# Patient Record
Sex: MALE | Race: WHITE | NOT HISPANIC OR LATINO | Employment: FULL TIME | URBAN - METROPOLITAN AREA
[De-identification: names, ages, dates, MRNs, and addresses within clinical notes are randomized per-mention and may not be internally consistent; named-entity substitution may affect disease eponyms.]

---

## 2018-02-09 ENCOUNTER — OFFICE VISIT (OUTPATIENT)
Dept: URGENT CARE | Facility: CLINIC | Age: 48
End: 2018-02-09
Payer: COMMERCIAL

## 2018-02-09 VITALS
DIASTOLIC BLOOD PRESSURE: 87 MMHG | TEMPERATURE: 98 F | BODY MASS INDEX: 26.22 KG/M2 | HEART RATE: 84 BPM | SYSTOLIC BLOOD PRESSURE: 129 MMHG | HEIGHT: 68 IN | OXYGEN SATURATION: 100 % | WEIGHT: 173 LBS

## 2018-02-09 DIAGNOSIS — S49.91XA INJURY OF RIGHT SHOULDER, INITIAL ENCOUNTER: Primary | ICD-10-CM

## 2018-02-09 PROCEDURE — 99499 UNLISTED E&M SERVICE: CPT | Mod: S$GLB,,, | Performed by: INTERNAL MEDICINE

## 2018-02-09 NOTE — PROGRESS NOTES
"Subjective:       Patient ID: Konstantin Montes is a 47 y.o. male.    Vitals:  height is 5' 8" (1.727 m) and weight is 78.5 kg (173 lb). His oral temperature is 97.5 °F (36.4 °C). His blood pressure is 129/87 and his pulse is 84. His oxygen saturation is 100%.     Chief Complaint: Shoulder Pain    Fell this morning on Rt. Shoulder.      Shoulder Pain    The pain is present in the right shoulder. This is a new problem. The current episode started today. There has been no history of extremity trauma. The problem occurs constantly. The problem has been unchanged. The quality of the pain is described as burning. The pain is at a severity of 6/10. Associated symptoms include a limited range of motion. Pertinent negatives include no numbness. He has tried nothing for the symptoms.     Review of Systems   Constitution: Negative for weakness and malaise/fatigue.   HENT: Negative for nosebleeds.    Cardiovascular: Negative for chest pain and syncope.   Respiratory: Negative for shortness of breath.    Musculoskeletal: Positive for joint pain (Rt shoulder). Negative for back pain and neck pain.   Gastrointestinal: Negative for abdominal pain.   Genitourinary: Negative for hematuria.   Neurological: Negative for dizziness and numbness.       Objective:      Physical Exam    Assessment:       No diagnosis found.    Plan:         There are no diagnoses linked to this encounter.  Patient has obvious gross deformity to right shoulder and was referred to the ER  "

## 2019-09-12 ENCOUNTER — HOSPITAL ENCOUNTER (OUTPATIENT)
Dept: RADIOLOGY | Facility: HOSPITAL | Age: 49
Discharge: HOME | End: 2019-09-12
Attending: INTERNAL MEDICINE
Payer: COMMERCIAL

## 2019-09-12 ENCOUNTER — OFFICE VISIT (OUTPATIENT)
Dept: NEPHROLOGY | Facility: CLINIC | Age: 49
End: 2019-09-12
Payer: COMMERCIAL

## 2019-09-12 VITALS
OXYGEN SATURATION: 97 % | DIASTOLIC BLOOD PRESSURE: 84 MMHG | SYSTOLIC BLOOD PRESSURE: 102 MMHG | HEIGHT: 68 IN | BODY MASS INDEX: 26.69 KG/M2 | WEIGHT: 176.13 LBS | HEART RATE: 108 BPM

## 2019-09-12 DIAGNOSIS — R94.4 RENAL FUNCTION TEST ABNORMAL: ICD-10-CM

## 2019-09-12 DIAGNOSIS — R94.4 RENAL FUNCTION TEST ABNORMAL: Primary | ICD-10-CM

## 2019-09-12 PROCEDURE — 76770 US EXAM ABDO BACK WALL COMP: CPT | Mod: 26,,, | Performed by: RADIOLOGY

## 2019-09-12 PROCEDURE — 99244 OFF/OP CNSLTJ NEW/EST MOD 40: CPT | Mod: S$GLB,,, | Performed by: INTERNAL MEDICINE

## 2019-09-12 PROCEDURE — 76770 US RETROPERITONEAL COMPLETE: ICD-10-PCS | Mod: 26,,, | Performed by: RADIOLOGY

## 2019-09-12 PROCEDURE — 99999 PR PBB SHADOW E&M-EST. PATIENT-LVL III: CPT | Mod: PBBFAC,,, | Performed by: INTERNAL MEDICINE

## 2019-09-12 PROCEDURE — 99244 PR OFFICE CONSULTATION,LEVEL IV: ICD-10-PCS | Mod: S$GLB,,, | Performed by: INTERNAL MEDICINE

## 2019-09-12 PROCEDURE — 76770 US EXAM ABDO BACK WALL COMP: CPT | Mod: TC,PO

## 2019-09-12 PROCEDURE — 99999 PR PBB SHADOW E&M-EST. PATIENT-LVL III: ICD-10-PCS | Mod: PBBFAC,,, | Performed by: INTERNAL MEDICINE

## 2019-09-12 NOTE — PROGRESS NOTES
Subjective:       Patient ID: Konstantin Montes is a 49 y.o. White male who presents for new patient evaluation for proteinuria.    Konstantin Montes is referred by Charo Wilkes MD to be evaluated for proteinuria.  He reports that he has been having some low back pain and mild flank pain for the past 2-3 weeks intermittently.  He has labs and urine done for insurance purposed and was found to have a Scr of 1.3 and 1.6 grams of proteinuria.  He does take NSAIDs regularly.        Review of Systems   Constitutional: Negative for appetite change, chills and fever.   Eyes: Negative for visual disturbance.   Respiratory: Negative for cough and shortness of breath.    Cardiovascular: Negative for chest pain and leg swelling.   Gastrointestinal: Negative for diarrhea, nausea and vomiting.   Genitourinary: Positive for flank pain. Negative for difficulty urinating, dysuria and hematuria.   Musculoskeletal: Positive for back pain. Negative for myalgias.   Skin: Negative for rash.   Neurological: Negative for headaches.   Psychiatric/Behavioral: Negative for sleep disturbance.       The past medical, family and social histories were reviewed for this encounter.     Past Medical History:   Diagnosis Date    Allergy     Gout     Hyperlipidemia     Hypertension     Hypothyroidism      No past surgical history on file.  Social History     Socioeconomic History    Marital status:      Spouse name: Not on file    Number of children: Not on file    Years of education: Not on file    Highest education level: Not on file   Occupational History    Not on file   Social Needs    Financial resource strain: Not on file    Food insecurity:     Worry: Not on file     Inability: Not on file    Transportation needs:     Medical: Not on file     Non-medical: Not on file   Tobacco Use    Smoking status: Never Smoker    Smokeless tobacco: Never Used   Substance and Sexual Activity    Alcohol use: Yes     Alcohol/week:  "0.0 oz     Comment: rarely    Drug use: No    Sexual activity: Yes     Partners: Female   Lifestyle    Physical activity:     Days per week: Not on file     Minutes per session: Not on file    Stress: Not on file   Relationships    Social connections:     Talks on phone: Not on file     Gets together: Not on file     Attends [employer] service: Not on file     Active member of club or organization: Not on file     Attends meetings of clubs or organizations: Not on file     Relationship status: Not on file   Other Topics Concern    Not on file   Social History Narrative    Not on file     Current Outpatient Medications   Medication Sig    hydrocodone-acetaminophen 5-325mg (NORCO) 5-325 mg per tablet Take 1 tablet by mouth every 4 (four) hours as needed for Pain.    allopurinol (ZYLOPRIM) 300 MG tablet TAKE 1 TABLET DAILY    levothyroxine (SYNTHROID) 50 MCG tablet TAKE 1 TABLET DAILY    naproxen (EC-NAPROSYN) 375 MG TbEC EC tablet Take 1 tablet (375 mg total) by mouth 2 (two) times daily with meals.    scopolamine (TRANSDERM-SCOP) 1.3-1.5 mg (1 mg over 3 days) Place 1 patch onto the skin every 72 hours.     No current facility-administered medications for this visit.        /84 (BP Location: Left arm, Patient Position: Sitting)   Pulse 108   Ht 5' 8" (1.727 m)   Wt 79.9 kg (176 lb 2.4 oz)   SpO2 97%   BMI 26.78 kg/m²     Objective:      Physical Exam   Constitutional: He is oriented to person, place, and time. He appears well-developed and well-nourished. No distress.   HENT:   Head: Normocephalic and atraumatic.   Eyes: Conjunctivae are normal.   Neck: Neck supple. No JVD present.   Cardiovascular: Normal rate, regular rhythm and normal heart sounds. Exam reveals no gallop and no friction rub.   No murmur heard.  Pulmonary/Chest: Effort normal and breath sounds normal. No respiratory distress. He has no wheezes. He has no rales.   Abdominal: Soft. Bowel sounds are normal. He exhibits no " distension. There is no tenderness.   Musculoskeletal: He exhibits no edema.   Neurological: He is alert and oriented to person, place, and time.   Skin: Skin is warm and dry. No rash noted.   Psychiatric: He has a normal mood and affect.   Vitals reviewed.      Assessment:       1. Renal function test abnormal        Plan:   Return to clinic in 1 month.  Labs for next visit include today a rp, ua, upc, cbc, uric acid, renal US.  Baseline creatinine is 1.3 in 2015 and this year.  Plan to assess today to see if she has evidence of a renal injury or disease.  He does not have HTN or any chronic diseases that would place him at risk of renal disease.  Please avoid or minimize all NSAIDS (ibuprofen, motrin, aleve, indocin, naprosyn) to minimize the risk to your kidneys.  Aspirin in a dose of 325 mg or less a day is likely the safest with regards to kidney function.  If you are able to take aspirin and do not have any bleeding problems or ulcers, this may be your best therapy.  Alternatively, acetaminophen (Tylenol) is the safer than NSAIDs with regards to kidney function.  I would ask you take this as directed on the bottle.  It is best to stay under 2 grams a day (4-500 mg tablets a day maximum).

## 2019-09-13 ENCOUNTER — TELEPHONE (OUTPATIENT)
Dept: NEPHROLOGY | Facility: CLINIC | Age: 49
End: 2019-09-13

## 2019-09-13 ENCOUNTER — PATIENT MESSAGE (OUTPATIENT)
Dept: NEPHROLOGY | Facility: CLINIC | Age: 49
End: 2019-09-13

## 2019-09-13 DIAGNOSIS — R94.4 RENAL FUNCTION TEST ABNORMAL: Primary | ICD-10-CM

## 2019-09-13 NOTE — TELEPHONE ENCOUNTER
----- Message from Lisandra Ferrari sent at 9/13/2019  3:22 PM CDT -----  Type:  Test Results    Who Called:  self  Name of Test (Lab/Mammo/Etc):  Labs and ultra sound   Date of Test:  09/12   Ordering Provider:  Dr Hall   Where the test was performed:  [facility]  Best Call Back Number:  [phone]   Additional Information:  Would like to discuss today

## 2019-09-16 ENCOUNTER — PATIENT MESSAGE (OUTPATIENT)
Dept: NEPHROLOGY | Facility: CLINIC | Age: 49
End: 2019-09-16

## 2019-09-16 NOTE — TELEPHONE ENCOUNTER
I spoke to him by phone.  I will expand his lab evaluation and we discussed the possibility of a renal biopsy.    I ordered more labs he will get Thursday or Friday.

## 2019-09-17 ENCOUNTER — LAB VISIT (OUTPATIENT)
Dept: LAB | Facility: HOSPITAL | Age: 49
End: 2019-09-17
Attending: INTERNAL MEDICINE
Payer: COMMERCIAL

## 2019-09-17 DIAGNOSIS — R94.4 RENAL FUNCTION TEST ABNORMAL: ICD-10-CM

## 2019-09-17 PROCEDURE — 86334 IMMUNOFIX E-PHORESIS SERUM: CPT | Mod: 26,,, | Performed by: PATHOLOGY

## 2019-09-17 PROCEDURE — 86334 PATHOLOGIST INTERPRETATION IFE: ICD-10-PCS | Mod: 26,,, | Performed by: PATHOLOGY

## 2019-09-17 PROCEDURE — 86334 IMMUNOFIX E-PHORESIS SERUM: CPT

## 2019-09-17 PROCEDURE — 80074 ACUTE HEPATITIS PANEL: CPT

## 2019-09-17 PROCEDURE — 86592 SYPHILIS TEST NON-TREP QUAL: CPT

## 2019-09-17 PROCEDURE — 36415 COLL VENOUS BLD VENIPUNCTURE: CPT | Mod: PO

## 2019-09-17 PROCEDURE — 80069 RENAL FUNCTION PANEL: CPT

## 2019-09-18 LAB
ALBUMIN SERPL BCP-MCNC: 4.3 G/DL (ref 3.5–5.2)
ANION GAP SERPL CALC-SCNC: 10 MMOL/L (ref 8–16)
BUN SERPL-MCNC: 23 MG/DL (ref 6–20)
CALCIUM SERPL-MCNC: 9.9 MG/DL (ref 8.7–10.5)
CHLORIDE SERPL-SCNC: 100 MMOL/L (ref 95–110)
CO2 SERPL-SCNC: 28 MMOL/L (ref 23–29)
CREAT SERPL-MCNC: 1.2 MG/DL (ref 0.5–1.4)
EST. GFR  (AFRICAN AMERICAN): >60 ML/MIN/1.73 M^2
EST. GFR  (NON AFRICAN AMERICAN): >60 ML/MIN/1.73 M^2
GLUCOSE SERPL-MCNC: 96 MG/DL (ref 70–110)
HAV IGM SERPL QL IA: NEGATIVE
HBV CORE IGM SERPL QL IA: NEGATIVE
HBV SURFACE AG SERPL QL IA: NEGATIVE
HCV AB SERPL QL IA: NEGATIVE
INTERPRETATION SERPL IFE-IMP: NORMAL
PHOSPHATE SERPL-MCNC: 2.9 MG/DL (ref 2.7–4.5)
POTASSIUM SERPL-SCNC: 4.3 MMOL/L (ref 3.5–5.1)
RPR SER QL: NORMAL
SODIUM SERPL-SCNC: 138 MMOL/L (ref 136–145)

## 2019-09-19 LAB — PATHOLOGIST INTERPRETATION IFE: NORMAL

## 2019-09-25 NOTE — TELEPHONE ENCOUNTER
----- Message from Wilfrido Loera sent at 9/25/2019  3:59 PM CDT -----  Contact: self   Patient want to speak with a nurse regarding test results have some questions please call back at [phone] (home) [phone] (work)

## 2019-10-30 ENCOUNTER — OFFICE VISIT (OUTPATIENT)
Dept: NEPHROLOGY | Facility: CLINIC | Age: 49
End: 2019-10-30
Payer: COMMERCIAL

## 2019-10-30 ENCOUNTER — LAB VISIT (OUTPATIENT)
Dept: LAB | Facility: HOSPITAL | Age: 49
End: 2019-10-30
Attending: INTERNAL MEDICINE
Payer: COMMERCIAL

## 2019-10-30 VITALS
SYSTOLIC BLOOD PRESSURE: 124 MMHG | WEIGHT: 178.81 LBS | OXYGEN SATURATION: 98 % | BODY MASS INDEX: 27.1 KG/M2 | HEART RATE: 112 BPM | HEIGHT: 68 IN | DIASTOLIC BLOOD PRESSURE: 80 MMHG

## 2019-10-30 DIAGNOSIS — R94.4 RENAL FUNCTION TEST ABNORMAL: ICD-10-CM

## 2019-10-30 DIAGNOSIS — R94.4 RENAL FUNCTION TEST ABNORMAL: Primary | ICD-10-CM

## 2019-10-30 LAB
BACTERIA #/AREA URNS HPF: ABNORMAL /HPF
BILIRUB UR QL STRIP: NEGATIVE
CLARITY UR: CLEAR
COLOR UR: YELLOW
CREAT UR-MCNC: 119 MG/DL (ref 23–375)
GLUCOSE UR QL STRIP: NEGATIVE
HGB UR QL STRIP: ABNORMAL
HYALINE CASTS #/AREA URNS LPF: 3 /LPF
KETONES UR QL STRIP: NEGATIVE
LEUKOCYTE ESTERASE UR QL STRIP: NEGATIVE
MICROSCOPIC COMMENT: ABNORMAL
NITRITE UR QL STRIP: NEGATIVE
PH UR STRIP: 6 [PH] (ref 5–8)
PROT UR QL STRIP: ABNORMAL
PROT UR-MCNC: 251 MG/DL (ref 0–15)
PROT/CREAT UR: 2.11 MG/G{CREAT} (ref 0–0.2)
RBC #/AREA URNS HPF: 1 /HPF (ref 0–4)
SP GR UR STRIP: 1.02 (ref 1–1.03)
URN SPEC COLLECT METH UR: ABNORMAL
WBC #/AREA URNS HPF: 2 /HPF (ref 0–5)

## 2019-10-30 PROCEDURE — 99999 PR PBB SHADOW E&M-EST. PATIENT-LVL III: ICD-10-PCS | Mod: PBBFAC,,, | Performed by: INTERNAL MEDICINE

## 2019-10-30 PROCEDURE — 3008F BODY MASS INDEX DOCD: CPT | Mod: CPTII,S$GLB,, | Performed by: INTERNAL MEDICINE

## 2019-10-30 PROCEDURE — 81000 URINALYSIS NONAUTO W/SCOPE: CPT | Mod: PO

## 2019-10-30 PROCEDURE — 3079F DIAST BP 80-89 MM HG: CPT | Mod: CPTII,S$GLB,, | Performed by: INTERNAL MEDICINE

## 2019-10-30 PROCEDURE — 99999 PR PBB SHADOW E&M-EST. PATIENT-LVL III: CPT | Mod: PBBFAC,,, | Performed by: INTERNAL MEDICINE

## 2019-10-30 PROCEDURE — 99214 OFFICE O/P EST MOD 30 MIN: CPT | Mod: S$GLB,,, | Performed by: INTERNAL MEDICINE

## 2019-10-30 PROCEDURE — 3074F PR MOST RECENT SYSTOLIC BLOOD PRESSURE < 130 MM HG: ICD-10-PCS | Mod: CPTII,S$GLB,, | Performed by: INTERNAL MEDICINE

## 2019-10-30 PROCEDURE — 3074F SYST BP LT 130 MM HG: CPT | Mod: CPTII,S$GLB,, | Performed by: INTERNAL MEDICINE

## 2019-10-30 PROCEDURE — 99214 PR OFFICE/OUTPT VISIT, EST, LEVL IV, 30-39 MIN: ICD-10-PCS | Mod: S$GLB,,, | Performed by: INTERNAL MEDICINE

## 2019-10-30 PROCEDURE — 84156 ASSAY OF PROTEIN URINE: CPT

## 2019-10-30 PROCEDURE — 3008F PR BODY MASS INDEX (BMI) DOCUMENTED: ICD-10-PCS | Mod: CPTII,S$GLB,, | Performed by: INTERNAL MEDICINE

## 2019-10-30 PROCEDURE — 3079F PR MOST RECENT DIASTOLIC BLOOD PRESSURE 80-89 MM HG: ICD-10-PCS | Mod: CPTII,S$GLB,, | Performed by: INTERNAL MEDICINE

## 2019-10-30 NOTE — PROGRESS NOTES
"Subjective:       Patient ID: Konstantin Montes is a 49 y.o. White male who presents for return patient evaluation for proteinuria.    His wife states he is not a water drinker.  He did have what he thinks was a gout flare in his right foot.  He does have mildly foamy urine at times.  He has taken some BC powders for his gout recently.      Review of Systems   Constitutional: Negative for appetite change, chills and fever.   Eyes: Negative for visual disturbance.   Respiratory: Negative for cough and shortness of breath.    Cardiovascular: Negative for chest pain and leg swelling.   Gastrointestinal: Negative for diarrhea, nausea and vomiting.   Genitourinary: Negative for difficulty urinating, dysuria, flank pain and hematuria.   Musculoskeletal: Positive for arthralgias (R foot) and back pain. Negative for myalgias.   Skin: Negative for rash.   Neurological: Negative for headaches.   Psychiatric/Behavioral: Negative for sleep disturbance.       The past medical, family and social histories were reviewed for this encounter.     /80 (BP Location: Right arm, Patient Position: Sitting)   Pulse (!) 112   Ht 5' 8" (1.727 m)   Wt 81.1 kg (178 lb 12.7 oz)   SpO2 98%   BMI 27.19 kg/m²     Objective:      Physical Exam   Constitutional: He is oriented to person, place, and time. He appears well-developed and well-nourished. No distress.   HENT:   Head: Normocephalic and atraumatic.   Eyes: Conjunctivae are normal.   Neck: Neck supple. No JVD present.   Cardiovascular: Normal rate, regular rhythm and normal heart sounds. Exam reveals no gallop and no friction rub.   No murmur heard.  Pulmonary/Chest: Effort normal and breath sounds normal. No respiratory distress. He has no wheezes. He has no rales.   Abdominal: Soft. Bowel sounds are normal. He exhibits no distension. There is no tenderness.   Musculoskeletal: He exhibits no edema.   Neurological: He is alert and oriented to person, place, and time.   Skin: Skin is " warm and dry. No rash noted.   Psychiatric: He has a normal mood and affect.   Vitals reviewed.      Assessment:       1. Renal function test abnormal        Plan:   Return to clinic in 6 weeks.  Labs for next visit include today ua and upc.  RP, uric acid, ua and upc next visit.  Baseline creatinine is 1.2-1.3 in 2015 and this year.  He does not have HTN or any chronic diseases that would place him at risk of renal disease.  Please avoid or minimize all NSAIDS (ibuprofen, motrin, aleve, indocin, naprosyn) to minimize the risk to your kidneys.  Aspirin in a dose of 325 mg or less a day is likely the safest with regards to kidney function.  If you are able to take aspirin and do not have any bleeding problems or ulcers, this may be your best therapy.  Alternatively, acetaminophen (Tylenol) is the safer than NSAIDs with regards to kidney function.  I would ask you take this as directed on the bottle.  It is best to stay under 2 grams a day (4-500 mg tablets a day maximum).  Nutrition referral.

## 2019-12-09 ENCOUNTER — OFFICE VISIT (OUTPATIENT)
Dept: NEPHROLOGY | Facility: CLINIC | Age: 49
End: 2019-12-09
Payer: COMMERCIAL

## 2019-12-09 VITALS
DIASTOLIC BLOOD PRESSURE: 82 MMHG | OXYGEN SATURATION: 99 % | WEIGHT: 189.38 LBS | BODY MASS INDEX: 28.7 KG/M2 | HEART RATE: 74 BPM | HEIGHT: 68 IN | SYSTOLIC BLOOD PRESSURE: 122 MMHG

## 2019-12-09 DIAGNOSIS — R94.4 RENAL FUNCTION TEST ABNORMAL: Primary | ICD-10-CM

## 2019-12-09 DIAGNOSIS — M10.079 IDIOPATHIC GOUT INVOLVING TOE, UNSPECIFIED CHRONICITY, UNSPECIFIED LATERALITY: ICD-10-CM

## 2019-12-09 PROCEDURE — 3079F DIAST BP 80-89 MM HG: CPT | Mod: CPTII,S$GLB,, | Performed by: INTERNAL MEDICINE

## 2019-12-09 PROCEDURE — 99999 PR PBB SHADOW E&M-EST. PATIENT-LVL III: CPT | Mod: PBBFAC,,, | Performed by: INTERNAL MEDICINE

## 2019-12-09 PROCEDURE — 3079F PR MOST RECENT DIASTOLIC BLOOD PRESSURE 80-89 MM HG: ICD-10-PCS | Mod: CPTII,S$GLB,, | Performed by: INTERNAL MEDICINE

## 2019-12-09 PROCEDURE — 3008F BODY MASS INDEX DOCD: CPT | Mod: CPTII,S$GLB,, | Performed by: INTERNAL MEDICINE

## 2019-12-09 PROCEDURE — 99999 PR PBB SHADOW E&M-EST. PATIENT-LVL III: ICD-10-PCS | Mod: PBBFAC,,, | Performed by: INTERNAL MEDICINE

## 2019-12-09 PROCEDURE — 3074F SYST BP LT 130 MM HG: CPT | Mod: CPTII,S$GLB,, | Performed by: INTERNAL MEDICINE

## 2019-12-09 PROCEDURE — 3074F PR MOST RECENT SYSTOLIC BLOOD PRESSURE < 130 MM HG: ICD-10-PCS | Mod: CPTII,S$GLB,, | Performed by: INTERNAL MEDICINE

## 2019-12-09 PROCEDURE — 99214 OFFICE O/P EST MOD 30 MIN: CPT | Mod: S$GLB,,, | Performed by: INTERNAL MEDICINE

## 2019-12-09 PROCEDURE — 99214 PR OFFICE/OUTPT VISIT, EST, LEVL IV, 30-39 MIN: ICD-10-PCS | Mod: S$GLB,,, | Performed by: INTERNAL MEDICINE

## 2019-12-09 PROCEDURE — 3008F PR BODY MASS INDEX (BMI) DOCUMENTED: ICD-10-PCS | Mod: CPTII,S$GLB,, | Performed by: INTERNAL MEDICINE

## 2019-12-09 NOTE — PROGRESS NOTES
"Subjective:       Patient ID: Konstantin Montes is a 49 y.o. White male who presents for return patient evaluation for proteinuria.    He states he has been taking some BC powders for headaches.  He does have mildly foamy urine at times.  He has no uremic or urinary symptoms and is in his usual state of health.  There have been no recent illnesses, hospitalizations or procedures.        Review of Systems   Constitutional: Negative for appetite change, chills and fever.   Eyes: Negative for visual disturbance.   Respiratory: Negative for cough and shortness of breath.    Cardiovascular: Negative for chest pain and leg swelling.   Gastrointestinal: Negative for diarrhea, nausea and vomiting.   Genitourinary: Negative for difficulty urinating, dysuria, flank pain and hematuria.   Musculoskeletal: Positive for arthralgias (R foot) and back pain. Negative for myalgias.   Skin: Negative for rash.   Neurological: Negative for headaches.   Psychiatric/Behavioral: Negative for sleep disturbance.       The past medical, family and social histories were reviewed for this encounter.     /82 (BP Location: Right arm, Patient Position: Sitting)   Pulse 74   Ht 5' 8" (1.727 m)   Wt 85.9 kg (189 lb 6 oz)   SpO2 99%   BMI 28.79 kg/m²     Objective:      Physical Exam   Constitutional: He is oriented to person, place, and time. He appears well-developed and well-nourished. No distress.   HENT:   Head: Normocephalic and atraumatic.   Eyes: Conjunctivae are normal.   Neck: Neck supple. No JVD present.   Cardiovascular: Normal rate, regular rhythm and normal heart sounds. Exam reveals no gallop and no friction rub.   No murmur heard.  Pulmonary/Chest: Effort normal and breath sounds normal. No respiratory distress. He has no wheezes. He has no rales.   Abdominal: Soft. Bowel sounds are normal. He exhibits no distension. There is no tenderness.   Musculoskeletal: He exhibits no edema.   Neurological: He is alert and oriented to " person, place, and time.   Skin: Skin is warm and dry. No rash noted.   Psychiatric: He has a normal mood and affect.   Vitals reviewed.      Assessment:       1. Renal function test abnormal    2. Idiopathic gout involving toe, unspecified chronicity, unspecified laterality        Plan:   Return to clinic in 3 months.  Labs for next visit include rp, upc, uric acid  Baseline creatinine is 1.2-1.3 in 2015 and this year.  He does not have HTN or any chronic diseases that would place him at risk of renal disease.  Please avoid or minimize all NSAIDS (ibuprofen, motrin, aleve, indocin, naprosyn) to minimize the risk to your kidneys.  Aspirin in a dose of 325 mg or less a day is likely the safest with regards to kidney function.  If you are able to take aspirin and do not have any bleeding problems or ulcers, this may be your best therapy.  Alternatively, acetaminophen (Tylenol) is the safer than NSAIDs with regards to kidney function.  I would ask you take this as directed on the bottle.  It is best to stay under 2 grams a day (4-500 mg tablets a day maximum).  Renal US shows R 10.6 cm, L 10.4 cm.  UPC is 2.1.  His serologic evaluation has been negative to date.  With a falling Scr and a negative US and workup thus far, we discussed biopsy but he wishes to monitor for now.

## 2020-03-16 ENCOUNTER — PATIENT MESSAGE (OUTPATIENT)
Dept: NEPHROLOGY | Facility: CLINIC | Age: 50
End: 2020-03-16

## 2020-09-07 ENCOUNTER — PATIENT MESSAGE (OUTPATIENT)
Dept: NEPHROLOGY | Facility: CLINIC | Age: 50
End: 2020-09-07

## 2020-09-07 DIAGNOSIS — M10.079 IDIOPATHIC GOUT INVOLVING TOE, UNSPECIFIED CHRONICITY, UNSPECIFIED LATERALITY: ICD-10-CM

## 2020-09-07 DIAGNOSIS — R94.4 RENAL FUNCTION TEST ABNORMAL: Primary | ICD-10-CM

## 2024-02-15 ENCOUNTER — TELEPHONE (OUTPATIENT)
Dept: NEPHROLOGY | Facility: CLINIC | Age: 54
End: 2024-02-15
Payer: COMMERCIAL

## 2024-02-15 NOTE — TELEPHONE ENCOUNTER
----- Message from Jeanna Bills sent at 2/15/2024  9:24 AM CST -----  Regarding: eca  Contact: patient  Type:  Sooner Appointment Request    Caller is requesting a sooner appointment.  Caller declined first available appointment listed below.  Caller will not accept being placed on the waitlist and is requesting a message be sent to doctor.    Name of Caller:  patient   When is the first available appointment?    Symptoms:  eca - kidney issues  Would the patient rather a call back or a response via [facility]?   Best Call Back Number:  [phone]'  Additional Information:  call to be seen thanks

## 2024-02-16 ENCOUNTER — PATIENT MESSAGE (OUTPATIENT)
Dept: NEPHROLOGY | Facility: CLINIC | Age: 54
End: 2024-02-16

## 2024-02-16 ENCOUNTER — OFFICE VISIT (OUTPATIENT)
Dept: NEPHROLOGY | Facility: CLINIC | Age: 54
End: 2024-02-16
Payer: COMMERCIAL

## 2024-02-16 VITALS
HEIGHT: 68 IN | DIASTOLIC BLOOD PRESSURE: 90 MMHG | OXYGEN SATURATION: 99 % | HEART RATE: 72 BPM | BODY MASS INDEX: 28.74 KG/M2 | SYSTOLIC BLOOD PRESSURE: 136 MMHG

## 2024-02-16 DIAGNOSIS — R79.89 ELEVATED SERUM CREATININE: Primary | ICD-10-CM

## 2024-02-16 PROCEDURE — 3075F SYST BP GE 130 - 139MM HG: CPT | Mod: CPTII,S$GLB,, | Performed by: INTERNAL MEDICINE

## 2024-02-16 PROCEDURE — 3008F BODY MASS INDEX DOCD: CPT | Mod: CPTII,S$GLB,, | Performed by: INTERNAL MEDICINE

## 2024-02-16 PROCEDURE — 1160F RVW MEDS BY RX/DR IN RCRD: CPT | Mod: CPTII,S$GLB,, | Performed by: INTERNAL MEDICINE

## 2024-02-16 PROCEDURE — 99999 PR PBB SHADOW E&M-EST. PATIENT-LVL III: CPT | Mod: PBBFAC,,, | Performed by: INTERNAL MEDICINE

## 2024-02-16 PROCEDURE — 3080F DIAST BP >= 90 MM HG: CPT | Mod: CPTII,S$GLB,, | Performed by: INTERNAL MEDICINE

## 2024-02-16 PROCEDURE — 3066F NEPHROPATHY DOC TX: CPT | Mod: CPTII,S$GLB,, | Performed by: INTERNAL MEDICINE

## 2024-02-16 PROCEDURE — 99204 OFFICE O/P NEW MOD 45 MIN: CPT | Mod: S$GLB,,, | Performed by: INTERNAL MEDICINE

## 2024-02-16 PROCEDURE — 1159F MED LIST DOCD IN RCRD: CPT | Mod: CPTII,S$GLB,, | Performed by: INTERNAL MEDICINE

## 2024-02-16 PROCEDURE — 3044F HG A1C LEVEL LT 7.0%: CPT | Mod: CPTII,S$GLB,, | Performed by: INTERNAL MEDICINE

## 2024-02-16 NOTE — PROGRESS NOTES
Subjective:       Patient ID: Konstantin Montes is a 53 y.o. White male who presents for new patient evaluation for chronic renal failure.    Konstantin Montes is referred by Charo Wilkes MD to be evaluated for chronic renal failure.  He recently had an episode of gout in his right knee.  He has been drinking 3-6 sodas a day.  We discussed gout dietary changes today.  He has no uremic or urinary symptoms and is in his usual state of health.        Review of Systems   Constitutional:  Positive for fatigue. Negative for chills, diaphoresis and fever.   Respiratory:  Positive for cough. Negative for shortness of breath.    Cardiovascular:  Negative for chest pain and leg swelling.   Gastrointestinal:  Negative for abdominal pain, diarrhea, nausea and vomiting.   Genitourinary:  Negative for difficulty urinating, dysuria and hematuria.   Musculoskeletal:  Negative for myalgias.   Neurological:  Negative for headaches.   Hematological:  Does not bruise/bleed easily.   Psychiatric/Behavioral:  Positive for sleep disturbance.        The past medical, family and social histories were reviewed for this encounter.     Past Medical History:   Diagnosis Date    Allergy     Gout     Hyperlipidemia     Hypertension     Hypothyroidism      No past surgical history on file.  Social History     Socioeconomic History    Marital status:    Tobacco Use    Smoking status: Never    Smokeless tobacco: Never   Substance and Sexual Activity    Alcohol use: Yes     Alcohol/week: 0.0 standard drinks of alcohol     Comment: rarely    Drug use: No    Sexual activity: Yes     Partners: Female     Current Outpatient Medications   Medication Sig    BINAXNOW COVID-19 AG SELF TEST Kit TEST AS DIRECTED TODAY    docosahexanoic acid/epa (FISH OIL ORAL) Take by mouth once daily.    predniSONE (DELTASONE) 20 MG tablet Take 1 tablet (20 mg total) by mouth 2 (two) times daily. for 5 days     No current facility-administered medications for  "this visit.     BP (!) 136/90 (BP Location: Right arm, Patient Position: Sitting, BP Method: Large (Manual))   Pulse 72   Ht 5' 8" (1.727 m)   SpO2 99%   BMI 28.74 kg/m²     Objective:      Physical Exam  Vitals reviewed.   Constitutional:       General: He is not in acute distress.     Appearance: He is well-developed.   HENT:      Head: Normocephalic and atraumatic.   Eyes:      General: No scleral icterus.     Conjunctiva/sclera: Conjunctivae normal.   Neck:      Vascular: No JVD.   Cardiovascular:      Rate and Rhythm: Normal rate and regular rhythm.      Heart sounds: Normal heart sounds. No murmur heard.     No friction rub. No gallop.   Pulmonary:      Effort: Pulmonary effort is normal. No respiratory distress.      Breath sounds: Normal breath sounds. No wheezing.   Abdominal:      General: Bowel sounds are normal. There is no distension.      Palpations: Abdomen is soft.      Tenderness: There is no abdominal tenderness.   Musculoskeletal:      Right lower leg: No edema.      Left lower leg: No edema.   Skin:     General: Skin is warm and dry.      Findings: No rash.   Neurological:      Mental Status: He is alert and oriented to person, place, and time.         Assessment:       No diagnosis found.    Lab Results   Component Value Date    CREATININE 1.48 (H) 02/12/2024    BUN 23 (H) 02/12/2024     02/12/2024    K 4.4 02/12/2024    CL 99 02/12/2024    CO2 28 02/12/2024     Lab Results   Component Value Date    CALCIUM 10.3 (H) 02/12/2024    PHOS 3.5 09/14/2020     Lab Results   Component Value Date    HCT 49.2 02/12/2024     Prot/Creat Ratio, Urine   Date Value Ref Range Status   10/30/2019 2.11 (H) 0.00 - 0.20 Final   09/12/2019 2.17 (H) 0.00 - 0.20 Final       Plan:   Return to clinic in 6 months.  Labs for next visit include labs per standing orders.  US next time and cystatin c.  UACR UPC ua micro this week or next.  Baseline creatinine is 1.3-1.5 since 2015.  Renal US shows R 10.4 cm L 10.4 " cm.  Please avoid or minimize all NSAIDS (ibuprofen, motrin, aleve, indocin, naprosyn) to minimize the risk to your kidneys.  Aspirin in a dose of 325 mg or less a day is likely the safest with regards to kidney function.  If you are able to take aspirin and do not have any bleeding problems or ulcers, this may be your best therapy.  Alternatively, acetaminophen (Tylenol) is the safer than NSAIDs with regards to kidney function.  I would ask you take this as directed on the bottle.  It is best to stay under 2 grams a day (4-500 mg tablets a day maximum).  We discussed a gout diet.  I did send him an email regarding this.  Refer to Lifestyle Dietician.    Computed KFRE 2-Year unavailable. Necessary lab results were not found in the last year.    Computed KFRE 5-Year unavailable. Necessary lab results were not found in the last year.

## 2024-02-19 ENCOUNTER — TELEPHONE (OUTPATIENT)
Dept: NEPHROLOGY | Facility: CLINIC | Age: 54
End: 2024-02-19
Payer: COMMERCIAL

## 2024-02-19 NOTE — TELEPHONE ENCOUNTER
----- Message from Fran Hall MD sent at 2/18/2024  7:31 AM CST -----  You have a mild amount of protein in your urine which we will monitor.

## 2024-03-25 ENCOUNTER — PATIENT MESSAGE (OUTPATIENT)
Dept: NEPHROLOGY | Facility: CLINIC | Age: 54
End: 2024-03-25
Payer: COMMERCIAL

## 2024-06-19 ENCOUNTER — CLINICAL SUPPORT (OUTPATIENT)
Dept: NUTRITION | Facility: CLINIC | Age: 54
End: 2024-06-19
Payer: COMMERCIAL

## 2024-06-19 DIAGNOSIS — R79.89 ELEVATED SERUM CREATININE: ICD-10-CM

## 2024-06-19 PROCEDURE — 97802 MEDICAL NUTRITION INDIV IN: CPT | Mod: 95,,, | Performed by: DIETITIAN, REGISTERED

## 2024-06-19 NOTE — PROGRESS NOTES
"The patient location is: Pt home in [place]  The chief complaint leading to consultation is: optimizing nutrition for his health    Visit type: audiovisual    Face to Face time with patient: 120 minutes  120 minutes of total time spent on the encounter, which includes face to face time and non-face to face time preparing to see the patient (eg, review of tests), Obtaining and/or reviewing separately obtained history, Documenting clinical information in the electronic or other health record, Independently interpreting results (not separately reported) and communicating results to the patient/family/caregiver, or Care coordination (not separately reported).         Each patient to whom he or she provides medical services by telemedicine is:  (1) informed of the relationship between the physician and patient and the respective role of any other health care provider with respect to management of the patient; and (2) notified that he or she may decline to receive medical services by telemedicine and may withdraw from such care at any time.    Notes:   Nutrition Assessment    Visit Type: Insurance initial  Session Time:  2 Hours  Reason for MNT visit: Pt in for education and nutrition counseling regarding  desired weight loss, improving GOUT flare ups and improving kidney health .       Age: 53 y.o.  Wt: 185-190 lbs   Wt Readings from Last 1 Encounters:   02/19/24 84.3 kg (185 lb 12.8 oz)     Ht:   Ht Readings from Last 1 Encounters:   03/25/24 5' 8" (1.727 m)     BMI:   BMI Readings from Last 1 Encounters:   03/25/24 28.25 kg/m²       Client states:  diet a few years ago, did optivia, went from 200+ to 155 lbs, then experienced kidney issues // GOUT, kidney health   155 lbs 25 lbs --   Flare up coming on for GOUT-- love seafood -- loves shrimp, last GOUT flare up was a few weeks ago-- beers triggered-- affects in knee or jammed toe. Flare ups are happening more frequently - 3 x unable to walk, some mild, can last 1-2 weeks, "   Prefer to not take medication   Pt accompanied with wife during visit  Hard to stay consistent     Medical History  Problem List             Resolved    Abnormal blood chemistry         Idiopathic gout         Gout         Essential (primary) hypertension         Hypertriglyceridemia         Adult hypothyroidism         Avitaminosis D            Past Medical History:   Diagnosis Date    Allergy     Gout     Hyperlipidemia     Hypertension     Hypothyroidism        No past surgical history on file.       Medications    Prior to Admission medications    Medication Sig Start Date End Date Taking? Authorizing Provider   allopurinoL (ZYLOPRIM) 100 MG tablet Take 1 tablet (100 mg total) by mouth once daily. 2/19/24   Chris Young MD   colchicine (MITIGARE) 0.6 mg Cap Take 1 capsule (0.6 mg total) by mouth 2 (two) times a day. 2/19/24   Chris Young MD   docosahexanoic acid/epa (FISH OIL ORAL) Take by mouth once daily.    Provider, Historical   magnesium 30 mg Tab Take 144 mg by mouth 3 (three) times daily.    Provider, Historical        Vitamins, Minerals, and/or Supplements:  Not often or consistent, has Neuro-Mg, fish oil     Food Allergies or Intolerances:  Spicy foods causing upset stomach     Social History    Marital status:      Social History     Tobacco Use    Smoking status: Never    Smokeless tobacco: Never   Substance Use Topics    Alcohol use: Yes     Alcohol/week: 0.0 standard drinks of alcohol     Comment: rarely     Current Alcohol use: Rare occasion- had a few beers // [place] // Once every few months     Lab Reports:  Reviewed and noted     Lab Results   Component Value Date    TSH 3.010 02/12/2024    FREET4 1.11 09/14/2020    AST 37 02/12/2024    ALT 31 02/12/2024    HDL 43 02/12/2024    LDLCALC 179.0 (H) 02/12/2024    TRIG 170 (H) 02/12/2024    HGBA1C 5.5 02/12/2024         BP Readings from Last 1 Encounters:   02/19/24 118/78        24-hour Recall:     Reviewed and noted during  consultation.  PT- appointment, Gym- not often going   Working on Eastern time  High Carbohydrates-- loves cheese and bread       Wake: 6am   Water  Working on [email or web address], [facility]  Red Bull --> switch to black coffee  7/8:20am BF- oat meal - rolled oats + dewayne seeds / eggs + ham cheese/ leftover pizza / chicken biscuit // greek yogurt + berries + dewayne seeds + splenda // beyond meat sausage + eggs   ** Recommend Drink 45 fl ounces water  11:00-3pm Lunch- sometimes work through lunch, sandwich-  // loved bread  11:00-12:30pm    teenager from school-- Band practice   **Recommend drinking additional 45 fl ounces water   3pm Snack- not often   5-6pm Dinner- sandwich// thermamix-- chicken parm with pasta / beef stroganoff/ chicken enchilladas/ red beans and rice/ pizza/ spaghetti and meatballs / beef and broccoli/ chicken shawma- on stove/ greek salad    Watch TV- news, shows, play games on laptop   Sometimes fall asleep on the couch   Sometimes black cherries   12- cheese on toast/ sandwich / avocado + jalapeno   Bed- 12/1/2am  Kitchen Closed 8/9pm   Recommend going to sleep by 11:00pm (goal)      Food choices (After Midnight)  Patient eating midnight to 4am: (P) Once or twice a week   Home cooked meals (Midnight - 4am): (P) Cheese on tost, sandwich, popcorn etc   Fast food meals (Midnight - 4am): (P) Leftovers or when when traveling          Food choices (Early Morning)  Patient eats 4am to 8am: (P) Once or twice a week   Home cooked meals (4am - 8am): (P) Oatmeal, eggs, cereal   Fast food meals (4am - 8am): (P) Occasionally a chicken biscuit from [employer]   Snacks (4am - 8am): (P) If running. Maybe a honey bun      Food choices (Morning)  Patient eats 8am to noon: (P) Several times a week   Home cooked meals (8am - noon): (P) Depends. Lunch can be cooked or sandwich   Fast food meals (8am - midnight): (P) Leftover pizza from the night before         Food choices (Afternoon)  Patient eats noon to 4pm: (P) Several  times a week   Home cooked meals (Noon - 4pm): (P) Varries. Mostly cooked   Snacks (Noon to 4pm): (P) Popcorn etc     Food choices (Evening)   Patient eats 4pm to 8pm: (P) Several times a week   Home cooked meals (4pm - 8pm): (P) Can be pasta, stir melendez, etc   Fast food meals (4pm - 8pm): (P) Pizza etc   Snacks (4pm - 8pm): (P) Popcorn Etc     Food choices (Late evening)  R [facility] PEQ NUTRITION HOW OFTEN EATING LATE EVENING: (P) Once or twice a week   Home cooked meals (8pm - midnight): (P) Pasta, red beans and rice, fish, chicken, etc   Fast food meals (8pm - midnight): (P) Occasionally- various places   Snacks (8pm - midnight: (P) Occasionally      Beverages:  How much water consumed (per day?): (P) 0 Pt reports that he gets bored with water / sweet drinks    Red Bull 2-3 cans SF -- 80 mg caffeine   Cups of milk consumed (per day?): (P) 0       Cups of  juice consumed (per day?): (P) 0   Number of supplement shakes (per day?): (P) 0       Number of cups of coffee (per day?): (P) 0           Cups of soda consumed (per day?): (P) 6   Other non-alcoholic beverages consumed (per day?): (P) 5   Types of other beverages: (P) Red Bull, Coke Zero, Ice drinks, orange juice etc      LIFESTYLE FACTORS    Dinning out: 1-2 x per week Now has a Thermomix to help cook at home-- Recipes - some healthy and not as healthy. Previously going out to eat daily     Meal preparation/shopping: Who does the grocery shopping:: (P) My wife Who prepares the meals: (P) My wife     Sleep: poor, Pt reports being a night owl, 12am or 2am going to sleep, up at 6am, goal 7-9 hours of sleep     Stress Level: high, pt elderly dad has health issues, and property that they need to manage 2-3 years of this   Managing- avoidance   Box Breathing -- 444 method     Support System:  spouse, friends, and family    Exercise Regimen: lightly active (low intensity, 1-3 days a week) PT with lower back and knee, 2x per week.   Enjoy bike riding     Diagnosis    Food  and nutrition related knowledge deficit related to GOUT and kidney health as evidenced by pt consuming foods that promote GOUT flare ups and not consuming enough water.      Intervention    Estimated Energy Requirements:   Calories: 1700  Protein: 125-150 g  Carbohydrates: 150-170 g  Total Fat: 55-65 g  Baseline for fluids: 90 fl ounces    Recommendations & Goals:  Patient goals and recommendations are tailored to the specific patient's needs, readiness to change, lifestyle, culture, skills, resources, & abilities. Strategies to help achieve these nutrition-related goals were discussed which can include but are not limited to SMART goal setting & mindful eating.     Aim for a minimum of 7 hours sleep   Exercise 60 minutes most days  Eat breakfast within 1-2 hours of waking up  Try not to skip any meals or snacks, not going more than 3-4 hours without eating   At each meal and snack, try to include a source of fiber + lean protein + healthy fat     Written Materials Provided  These resources are intended to assist the patient in making it easier to choose recommended options when eating out & to identify better-for-you brands at the grocery store:    Meal Planning Guide with recommendations discussed along with portion sizes and a customized meal plan   Fueling Well On-the-Go Food Guide  Eat Fit Shopping Guide  Lifestyle Nutrition Meal Guide  RD contact information    Goals:   -  Drink more water, building up to 90 fl ounces water a day  -  Improving sleep, trying to go to bed by 11pm   -  meal pattern throughout the day   -  Increasing healthier options in [employer]ramix       Monitoring/Evaluation    Monitor the following:  Weight  Sleep  Stress Management  Movement  Nutrient intake in reference to meal plan    Communicated with healthcare provider and documented plan for referral to appropriate agency/healthcare provider as needed    Supervising Physician: Oscar Clark MD    Patient motivation, anticipated barriers,  expected compliance: Patient is motivated and has verbalized understanding and intent to comply.     Comprehension: good     Follow-up: in 4-6 weeks

## 2024-06-30 ENCOUNTER — PATIENT MESSAGE (OUTPATIENT)
Dept: NUTRITION | Facility: CLINIC | Age: 54
End: 2024-06-30
Payer: COMMERCIAL

## 2024-07-02 NOTE — PATIENT INSTRUCTIONS
Name: Konstantin Montes   Date: 06/19/2024    Recommended daily energy requirements to reach your goals:  1700 Calories  125-150 grams Protein  150-170 grams Carbohydrates  55-65 grams Fat  7846-2012 mg Sodium  90 ounces of fluid per day    Action Items:   -  Drink more water, building up to 90 fl ounces water a day  -  Improving sleep, trying to go to bed by 11pm   -  meal pattern throughout the day   -  Increasing healthier options in [employer]     Adjusting Typical Day:  Wake: 6am   Water  Working on [employer], [facility]  Red Bull --> switch to black coffee  7/8:20am BF- oat meal - rolled oats + dewayne seeds / eggs + ham cheese/ leftover pizza / chicken biscuit // greek yogurt + berries + dewayne seeds + splenda // beyond meat sausage + eggs   ** Recommend Drink 45 fl ounces water  11:00-3pm Lunch- sometimes work through lunch, sandwich-  // loved bread  Recommend Lunch between : 11:00-12:30pm    teenager from school-- Band practice   **Recommend drinking additional 45 fl ounces water   Add in 3pm Snack when going longer than 4 hours between meals  5-6pm Dinner- sandwich// thermamix-- chicken parm with pasta / beef stroganoff/ chicken enchilladas/ red beans and rice/ pizza/ spaghetti and meatballs / beef and broccoli/ chicken shawma- on stove/ greek salad    Watch TV- news, shows, play games on laptop --> increase movement  Sometimes fall asleep on the couch   Sometimes black cherries   12- cheese on toast/ sandwich / avocado + jalapeno   Bed- 12/1/2am  Recommend Kitchen Closed 8/9pm   Recommend going to sleep by 11:00pm (goal)                                                   Gout    Inflammatory gout is among the oldest known kinds of arthritis, and its link with diet is legendary. Since ancient times, gout has been associated with overindulgence and gluttony. It was originally considered to be a disease of the affluent and called the disease of kings and the andrey of diseases and the rich mans disease due to its  association with rich foods and alcohol that were largely unavailable to the poor working classes (Jim, Alcides, Isidro, 2004).  Gout occurs predominantly in men, and approximately 3.4 million men in the [place] are affected. Gout typically occurs in middle age with a peak incidence in the fifth decade.  However, recent reports suggest that gout is being seen earlier, between the third and fifth decades rather than between the fourth and sixth, as documented in earlier studies. Gout tends to be rare in women until menopause and usually does not commonly occur until after the age of 60 (Jeronimo, 2004; Bob, 1998; Cindy, 1998), but due to increased longevity and frequent use of thiazide diuretics (a risk factor for gout), its prevalence among elderly women in Western countries has also risen (Anand, 2001).  Gout accounts for about 5% of all cases of arthritis, and its prevalence is increasing in the [place] and [place]. In general, the rise in cases has been associated with several factors related to Western society, including diet, alcohol intake, increased use of medications that can cause hyperuricemia (such as thiazide and loop diuretics), and the increased prevalence of obesity (Papito, 2003). For example, since the introduction of a Western diet and lifestyle, gout has reached epidemic proportions among some native tribes such as the [place] of [place]. Increases in serum uric acid levels and incidence of gout in [place] immigrant groups such as the [place] and [place], which has been attributed to dietary changes that include large amounts of meat and saturated fat, have also been observed. Further, gout, which in the past had been relatively rare among rural black groups in [place] where traditional agricultural and dairy-based meals were common, is now on the rise, particularly in urban communities. And finally, greater longevity of the population and increased survival of  persons with advanced forms of renal and cardiac diseases have contributed to this rise (Isidro, 2004; Bob, 1998; Jeronimo, 2004).  Numerous factors have been associated with increased risk of developing gout:  Genetics: One in four people with gout have a family history.  Gender and age: More men than women between the ages of 30 and 50 are likely to develop gout, primarily because women tend to have lower uric acid levels than men. Women generally develop symptoms after menopause (it is thought that estrogen may play a protective role).  Medical conditions: Untreated high blood pressure, diabetes, cardiovascular disease, metabolic syndrome, hyperlipidemia, arteriosclerosis, insulin resistance, and renal insufficiency (and other conditions that affect kidney excretion of uric acid), as well as surgery, a sudden or severe illness or injury, or immobility due to bed rest are associated with gout.  Medications: Thiazide and loop diuretics low-dose aspirin, cyclosporine, niacin, tuberculosis drugs (ethambutol, pyrazinamide), didanoside, and some chemotherapy cancer treatments are related to incidence of gout.  Lifestyle factors: High intake of alcohol, especially beer, high intake of meat and seafood, and obesity have been associated with gout occurrence;(Anand, 2001; Anand, 2004; Papito, 2003; Tracie, 2004).  The relationship between nutrition and gout has primarily centered around diets high in purine-rich foods and excessive alcohol consumption. Early studies had found that combining purine-rich foods with excessive alcohol consumption, particularly beer, could trigger an attack of gout (Jenifer, 1980). In contrast, consumption of dairy products have been postulated by other researchers to confer protection (Almas,1984), which agrees with historical accounts and recent research. As far back at the 1600s, the philosopher Mane Hope had proposed that a diet low in meat and high in dairy foods  would help prevent gout (Isidro, 2004).  Recently, findings have confirmed that certain dietary patterns influence risk of developing gout. In their prospective study of male health professionals, Too and colleagues (Too, 2004a) found that increased risk of gout was associated with a higher consumption of meat and seafood but not with a higher consumption of animal or vegetable protein or purine-rich vegetables. In addition, the investigators found a strong inverse relationship between dairy products, especially low-fat dairy foods. In addition, these associations were independent of other suspected risk factors for gout, such as body mass index, older age, hypertension, use of alcohol or diuretics, and chronic renal failure.  In another prospective study also conducted with male health professionals (Too, 2004b), Too and colleagues verified the long-suspected association between alcohol intake and gout. They showed that the higher the daily alcohol intake, the more likely gout was to develop, with those who drank the most alcohol being twice as likely to develop gout compared to abstainers. The strongest relationship was with beer, was much less with hard liquor, and there appeared to be no increased risk for wine drinking.  However, there were very few men in the wine-drinking group, making this finding less robust.  In a recent study examining the association between coffee and tea consumption and serum uric acid levels, Too and Aaron using NHANES ([id number]) data conclused that; serum uric acid levels tended to decrease with increasing coffee intake and a trend toward a modest inverse assocation with decaffeinated coffee intake. No association was found between tea intake and serum uric acid.  Further, they found no association with caffeine intake from other beverages  (Too 2007a). In addition, in an examination of a direct association with risk of gout, Too and colleagues reported that  total caffeine  and tea intake were not associated with the risk of gout and concluded that their data suggested that  that long-term coffee consumption is associated with a lower risk of incident gout (Too 2007b).  Management of gout can be challenging, given that the disease frequently presents in association with comorbid conditions such as obesity, renal insufficiency, or hypertension. It cannot be cured but can be treated successfully and without complications with medication and nutrition therapy.

## 2024-07-17 ENCOUNTER — CLINICAL SUPPORT (OUTPATIENT)
Dept: NUTRITION | Facility: CLINIC | Age: 54
End: 2024-07-17
Payer: COMMERCIAL

## 2024-07-17 DIAGNOSIS — R79.89 ELEVATED SERUM CREATININE: Primary | ICD-10-CM

## 2024-07-17 PROCEDURE — 97803 MED NUTRITION INDIV SUBSEQ: CPT | Mod: 95,,, | Performed by: DIETITIAN, REGISTERED

## 2024-07-17 NOTE — PROGRESS NOTES
"The patient location is: Pt home in [place]  The chief complaint leading to consultation is: optimizing nutrition for his health    Visit type: audiovisual    Face to Face time with patient: 45 minutes  60 minutes of total time spent on the encounter, which includes face to face time and non-face to face time preparing to see the patient (eg, review of tests), Obtaining and/or reviewing separately obtained history, Documenting clinical information in the electronic or other health record, Independently interpreting results (not separately reported) and communicating results to the patient/family/caregiver, or Care coordination (not separately reported).         Each patient to whom he or she provides medical services by telemedicine is:  (1) informed of the relationship between the physician and patient and the respective role of any other health care provider with respect to management of the patient; and (2) notified that he or she may decline to receive medical services by telemedicine and may withdraw from such care at any time.    Notes:   Nutrition Assessment    Visit Type: insurance follow up  Session Time:  1 Hour  Reason for MNT visit: Pt in for education and nutrition counseling regarding  desired weight loss, improving GOUT flare ups and improving kidney health .       Age: 53 y.o.  Wt: 185-190 lbs   Wt Readings from Last 1 Encounters:   02/19/24 84.3 kg (185 lb 12.8 oz)     Ht:   Ht Readings from Last 1 Encounters:   03/25/24 5' 8" (1.727 m)     BMI:   BMI Readings from Last 1 Encounters:   03/25/24 28.25 kg/m²       Client states: 7/17/24- Pt reports that he has been maintaining, he has been going through stressful times in the summer working, handling kids activities and taking care of his dad.    XX  diet a few years ago, did optivia, went from 200+ to 155 lbs, then experienced kidney issues // GOUT, kidney health   155 lbs 25 lbs --   Flare up coming on for GOUT-- love seafood -- loves shrimp, last GOUT " flare up was a few weeks ago-- beers triggered-- affects in knee or jammed toe. Flare ups are happening more frequently - 3 x unable to walk, some mild, can last 1-2 weeks,   Prefer to not take medication   Pt accompanied with wife during visit  Hard to stay consistent     Medical History  Problem List             Resolved    Abnormal blood chemistry         Idiopathic gout         Gout         Essential (primary) hypertension         Hypertriglyceridemia         Adult hypothyroidism         Avitaminosis D            Past Medical History:   Diagnosis Date    Allergy     Gout     Hyperlipidemia     Hypertension     Hypothyroidism        No past surgical history on file.       Medications    Prior to Admission medications    Medication Sig Start Date End Date Taking? Authorizing Provider   allopurinoL (ZYLOPRIM) 100 MG tablet Take 1 tablet (100 mg total) by mouth once daily. 2/19/24   Chris Young MD   colchicine (MITIGARE) 0.6 mg Cap Take 1 capsule (0.6 mg total) by mouth 2 (two) times a day. 2/19/24   Chris Young MD   docosahexanoic acid/epa (FISH OIL ORAL) Take by mouth once daily.    Provider, Historical   magnesium 30 mg Tab Take 144 mg by mouth 3 (three) times daily.    Provider, Historical        Vitamins, Minerals, and/or Supplements:  Not often or consistent, has Neuro-Mg, fish oil     Food Allergies or Intolerances:  Spicy foods causing upset stomach     Social History    Marital status:      Social History     Tobacco Use    Smoking status: Never    Smokeless tobacco: Never   Substance Use Topics    Alcohol use: Yes     Alcohol/week: 0.0 standard drinks of alcohol     Comment: rarely     Current Alcohol use: Rare occasion- had a few beers // [place] // Once every few months     Lab Reports:  Reviewed and noted     Lab Results   Component Value Date    TSH 3.010 02/12/2024    FREET4 1.11 09/14/2020    AST 37 02/12/2024    ALT 31 02/12/2024    HDL 43 02/12/2024    LDLCALC 179.0 (H)  02/12/2024    TRIG 170 (H) 02/12/2024    HGBA1C 5.5 02/12/2024         BP Readings from Last 1 Encounters:   02/19/24 118/78        24-hour Recall:   Pt has been working on his action items:  -  Drink more water, building up to 90 fl ounces water a day --> Pt reports that it was great first couple of weeks   -  Improving sleep,--> trying to go to bed by 11pm   -  meal pattern throughout the day --> still working on improving this  -  Increasing healthier options in theromix --> sent social platforms of healthier options with the theromix   Haven't rescheduled PT yet    Son starting college at [employer], elderly dad.  This morning practiced breathing exercise.     Late BF  Lunch 3pm    Everyone needing everything all at once.   Sent Meal Plan-- Pt would like his wife to meet with me.     Cooking more at home, weekend social dinning out.     Weight today: maintaining -- 185 lbs     Pt needs organization with menu together, [employer] to get groceries   Healthy items for [employer]     Email meal plan: [email or web address] --> Deceptively delicious recipe book     Went to bed at 12am, then unable to keep it going // removing trigger foods       XX    Reviewed and noted during consultation.  PT- appointment, Gym- not often going   Working on Eastern time  High Carbohydrates-- loves cheese and bread       Wake: 6am   Water  Working on computer, [facility]  Red Bull --> switch to black coffee  7/8:20am BF- oat meal - rolled oats + dewayne seeds / eggs + ham cheese/ leftover pizza / chicken biscuit // greek yogurt + berries + dewayne seeds + splenda // beyond meat sausage + eggs   ** Recommend Drink 45 fl ounces water  11:00-3pm Lunch- sometimes work through lunch, sandwich-  // loved bread  11:00-12:30pm    teenager from school-- Band practice   **Recommend drinking additional 45 fl ounces water   3pm Snack- not often   5-6pm Dinner- sandwich// thermamix-- chicken parm with pasta / beef stroganoff/ chicken enchilladas/ red  beans and rice/ pizza/ spaghetti and meatballs / beef and broccoli/ chicken shawma- on stove/ greek salad    Watch TV- news, shows, play games on laptop   Sometimes fall asleep on the couch   Sometimes black cherries   12- cheese on toast/ sandwich / avocado + jalapeno   Bed- 12/1/2am  Kitchen Closed 8/9pm   Recommend going to sleep by 11:00pm (goal)       Food choices (After Midnight)  Patient eating midnight to 4am: (P) Once or twice a week      Home cooked meals (Midnight - 4am): (P) Cheese on tost, sandwich, popcorn etc              Fast food meals (Midnight - 4am): (P) Leftovers or when when traveling                                   Food choices (Early Morning)  Patient eats 4am to 8am: (P) Once or twice a week    Home cooked meals (4am - 8am): (P) Oatmeal, eggs, cereal            Fast food meals (4am - 8am): (P) Occasionally a chicken biscuit from [employer]       Snacks (4am - 8am): (P) If running. Maybe a honey bun                      Food choices (Morning)  Patient eats 8am to noon: (P) Several times a week   Home cooked meals (8am - noon): (P) Depends. Lunch can be cooked or sandwich           Fast food meals (8am - midnight): (P) Leftover pizza from the night before                    Food choices (Afternoon)  Patient eats noon to 4pm: (P) Several times a week   Home cooked meals (Noon - 4pm): (P) Varries. Mostly cooked         Snacks (Noon to 4pm): (P) Popcorn etc             Food choices (Evening)         Patient eats 4pm to 8pm: (P) Several times a week    Home cooked meals (4pm - 8pm): (P) Can be pasta, stir melendez, etc       Fast food meals (4pm - 8pm): (P) Pizza etc     Snacks (4pm - 8pm): (P) Popcorn Etc                Food choices (Late evening)  R [facility] PEQ NUTRITION HOW OFTEN EATING LATE EVENING: (P) Once or twice a week     Home cooked meals (8pm - midnight): (P) Pasta, red beans and rice, fish, chicken, etc      Fast food meals (8pm - midnight): (P) Occasionally- various places               Snacks (8pm - midnight: (P) Occasionally                     Beverages:  How much water consumed (per day?): (P) 0 Pt reports that he gets bored with water / sweet drinks     Red Bull 2-3 cans SF -- 80 mg caffeine   Cups of milk consumed (per day?): (P) 0                       Cups of  juice consumed (per day?): (P) 0       Number of supplement shakes (per day?): (P) 0                       Number of cups of coffee (per day?): (P) 0                                  Cups of soda consumed (per day?): (P) 6       Other non-alcoholic beverages consumed (per day?): (P) 5   Types of other beverages: (P) Red Bull, Coke Zero, Ice drinks, orange juice etc               LIFESTYLE FACTORS    Dinning out: 1-2 x per week Now has a Thermomix to help cook at home-- Recipes - some healthy and not as healthy. Previously going out to eat daily     Meal preparation/shopping:    his wife     Sleep: poor, Pt reports being a night owl, 12am or 2am going to sleep, up at 6am, goal 7-9 hours of sleep     Stress Level: high, pt elderly dad has health issues, and property that they need to manage 2-3 years of this   Managing- avoidance   Box Breathing -- 444 method     Support System:  spouse, friends, and family    Exercise Regimen: lightly active (low intensity, 1-3 days a week) PT with lower back and knee, 2x per week.   Enjoy bike riding     Diagnosis    Food and nutrition related knowledge deficit related to GOUT and kidney health as evidenced by pt consuming foods that promote GOUT flare ups and not consuming enough water.      Intervention    Estimated Energy Requirements:   Calories: 1700  Protein: 125-150 g  Carbohydrates: 150-170 g  Total Fat: 55-65 g  Baseline for fluids: 90 fl ounces    Recommendations & Goals:  Patient goals and recommendations are tailored to the specific patient's needs, readiness to change, lifestyle, culture, skills, resources, & abilities. Strategies to help achieve these nutrition-related goals were  discussed which can include but are not limited to SMART goal setting & mindful eating.     Aim for a minimum of 7 hours sleep   Exercise 60 minutes most days  Eat breakfast within 1-2 hours of waking up  Try not to skip any meals or snacks, not going more than 3-4 hours without eating   At each meal and snack, try to include a source of fiber + lean protein + healthy fat     Written Materials Provided  These resources are intended to assist the patient in making it easier to choose recommended options when eating out & to identify better-for-you brands at the grocery store:    Meal Planning Guide with recommendations discussed along with portion sizes and a customized meal plan   Fueling Well On-the-Go Food Guide  Eat Fit Shopping Guide  Lifestyle Nutrition Meal Guide  RD contact information    Goals:   -  Drink more water, building up to 90 fl ounces water a day  -  Improving sleep, trying to go to bed by 11pm   -  meal pattern throughout the day   -  Increasing healthier options in theramix       Monitoring/Evaluation    Monitor the following:  Weight  Sleep  Stress Management  Movement  Nutrient intake in reference to meal plan    Communicated with healthcare provider and documented plan for referral to appropriate agency/healthcare provider as needed    Supervising Physician: Oscar Clark MD    Patient motivation, anticipated barriers, expected compliance: Patient is motivated and has verbalized understanding and intent to comply.     Comprehension: good     Follow-up: in 6-8 weeks

## 2024-07-17 NOTE — PATIENT INSTRUCTIONS
THERMOfit With [employer]  Thermomix Healthy Recipes (@[email or web address])  Instagram photos and videos     Meal Plan-- Emailing      Name: Konstantin Montes   Date: 07/17/2024    Recommended daily energy requirements to reach your goals:  1700 Calories  125-150 grams Protein  150-170 grams Carbohydrates  55-65 grams Fat  5342-6707 mg Sodium  90 ounces of fluid per day     Action Items:   -  Drink more water, building up to 90 fl ounces water a day  -  Improving sleep, trying to go to bed by 11pm   -  meal pattern throughout the day   -  Increasing healthier options in theramix      Adjusting Typical Day:  Wake: 6am   Water  Working on computer, [facility]  Red Bull --> switch to black coffee  7/8:20am BF- oat meal - rolled oats + dewayne seeds / eggs + ham cheese/ leftover pizza / chicken biscuit // greek yogurt + berries + dewayne seeds + splenda // beyond meat sausage + eggs   ** Recommend Drink 45 fl ounces water  11:00-3pm Lunch- sometimes work through lunch, sandwich-  // loved bread  Recommend Lunch between : 11:00-12:30pm    teenager from school-- Band practice   **Recommend drinking additional 45 fl ounces water   Add in 3pm Snack when going longer than 4 hours between meals  5-6pm Dinner- sandwich// thermamix-- chicken parm with pasta / beef stroganoff/ chicken enchilladas/ red beans and rice/ pizza/ spaghetti and meatballs / beef and broccoli/ chicken shawma- on stove/ greek salad     Watch TV- news, shows, play games on laptop --> increase movement  Sometimes fall asleep on the couch   Sometimes black cherries   12- cheese on toast/ sandwich / avocado + jalapeno   Bed- 12/1/2am  Recommend Kitchen Closed 8/9pm   Recommend going to sleep by 11:00pm (goal)                                                 Gout     Inflammatory gout is among the oldest known kinds of arthritis, and its link with diet is legendary. Since ancient times, gout has been associated with overindulgence and gluttony. It was originally considered  to be a disease of the affluent and called the disease of kings and the andrey of diseases and the rich mans disease due to its association with rich foods and alcohol that were largely unavailable to the poor working classes (Jim, 1971, Isidro, 2004).  Gout occurs predominantly in men, and approximately 3.4 million men in the [place] are affected. Gout typically occurs in middle age with a peak incidence in the fifth decade.  However, recent reports suggest that gout is being seen earlier, between the third and fifth decades rather than between the fourth and sixth, as documented in earlier studies. Gout tends to be rare in women until menopause and usually does not commonly occur until after the age of 60 (Jeronimo, 2004; Bob, 1998; Cindy, 1998), but due to increased longevity and frequent use of thiazide diuretics (a risk factor for gout), its prevalence among elderly women in Western countries has also risen (Anand, 2001).  Gout accounts for about 5% of all cases of arthritis, and its prevalence is increasing in the [place] and [place]. In general, the rise in cases has been associated with several factors related to Western society, including diet, alcohol intake, increased use of medications that can cause hyperuricemia (such as thiazide and loop diuretics), and the increased prevalence of obesity (Papito, 2003). For example, since the introduction of a Western diet and lifestyle, gout has reached epidemic proportions among some native tribes such as the [place] of [place]. Increases in serum uric acid levels and incidence of gout in [place] immigrant groups such as the Danish and [place]o, which has been attributed to dietary changes that include large amounts of meat and saturated fat, have also been observed. Further, gout, which in the past had been relatively rare among rural black groups in [place] where traditional agricultural and dairy-based meals were common,  is now on the rise, particularly in urban communities. And finally, greater longevity of the population and increased survival of persons with advanced forms of renal and cardiac diseases have contributed to this rise (Isidro, 2004; Bob, 1998; Jeronimo, 2004).  Numerous factors have been associated with increased risk of developing gout:  Genetics: One in four people with gout have a family history.  Gender and age: More men than women between the ages of 30 and 50 are likely to develop gout, primarily because women tend to have lower uric acid levels than men. Women generally develop symptoms after menopause (it is thought that estrogen may play a protective role).  Medical conditions: Untreated high blood pressure, diabetes, cardiovascular disease, metabolic syndrome, hyperlipidemia, arteriosclerosis, insulin resistance, and renal insufficiency (and other conditions that affect kidney excretion of uric acid), as well as surgery, a sudden or severe illness or injury, or immobility due to bed rest are associated with gout.  Medications: Thiazide and loop diuretics low-dose aspirin, cyclosporine, niacin, tuberculosis drugs (ethambutol, pyrazinamide), didanoside, and some chemotherapy cancer treatments are related to incidence of gout.  Lifestyle factors: High intake of alcohol, especially beer, high intake of meat and seafood, and obesity have been associated with gout occurrence;(Anand, 2001; Anand, 2004; Papito, 2003; Tracie, 2004).  The relationship between nutrition and gout has primarily centered around diets high in purine-rich foods and excessive alcohol consumption. Early studies had found that combining purine-rich foods with excessive alcohol consumption, particularly beer, could trigger an attack of gout (Jenifer, 1980). In contrast, consumption of dairy products have been postulated by other researchers to confer protection (Almas,1984), which agrees with historical accounts and  recent research. As far back at the 1600s, the philosopher Mane Hope had proposed that a diet low in meat and high in dairy foods would help prevent gout (Isidro, 2004).  Recently, findings have confirmed that certain dietary patterns influence risk of developing gout. In their prospective study of male health professionals, Too and colleagues (Too, 2004a) found that increased risk of gout was associated with a higher consumption of meat and seafood but not with a higher consumption of animal or vegetable protein or purine-rich vegetables. In addition, the investigators found a strong inverse relationship between dairy products, especially low-fat dairy foods. In addition, these associations were independent of other suspected risk factors for gout, such as body mass index, older age, hypertension, use of alcohol or diuretics, and chronic renal failure.  In another prospective study also conducted with male health professionals (Too, 2004b), Too and colleagues verified the long-suspected association between alcohol intake and gout. They showed that the higher the daily alcohol intake, the more likely gout was to develop, with those who drank the most alcohol being twice as likely to develop gout compared to abstainers. The strongest relationship was with beer, was much less with hard liquor, and there appeared to be no increased risk for wine drinking.  However, there were very few men in the wine-drinking group, making this finding less robust.  In a recent study examining the association between coffee and tea consumption and serum uric acid levels, Natalia using NHANES ([id number]) data conclused that; serum uric acid levels tended to decrease with increasing coffee intake and a trend toward a modest inverse assocation with decaffeinated coffee intake. No association was found between tea intake and serum uric acid.  Further, they found no association with caffeine intake from other beverages  (Too  kinsey). In addition, in an examination of a direct association with risk of gout, Too and colleagues reported that  total caffeine and tea intake were not associated with the risk of gout and concluded that their data suggested that  that long-term coffee consumption is associated with a lower risk of incident gout (Too Barretob).  Management of gout can be challenging, given that the disease frequently presents in association with comorbid conditions such as obesity, renal insufficiency, or hypertension. It cannot be cured but can be treated successfully and without complications with medication and nutrition therapy.

## 2024-08-19 ENCOUNTER — OFFICE VISIT (OUTPATIENT)
Dept: NEPHROLOGY | Facility: CLINIC | Age: 54
End: 2024-08-19
Payer: COMMERCIAL

## 2024-08-19 VITALS — HEIGHT: 68 IN | BODY MASS INDEX: 28.25 KG/M2 | SYSTOLIC BLOOD PRESSURE: 138 MMHG | DIASTOLIC BLOOD PRESSURE: 82 MMHG

## 2024-08-19 DIAGNOSIS — R79.89 ELEVATED SERUM CREATININE: Primary | ICD-10-CM

## 2024-08-19 PROCEDURE — 1159F MED LIST DOCD IN RCRD: CPT | Mod: CPTII,S$GLB,, | Performed by: INTERNAL MEDICINE

## 2024-08-19 PROCEDURE — 3075F SYST BP GE 130 - 139MM HG: CPT | Mod: CPTII,S$GLB,, | Performed by: INTERNAL MEDICINE

## 2024-08-19 PROCEDURE — 3008F BODY MASS INDEX DOCD: CPT | Mod: CPTII,S$GLB,, | Performed by: INTERNAL MEDICINE

## 2024-08-19 PROCEDURE — 1160F RVW MEDS BY RX/DR IN RCRD: CPT | Mod: CPTII,S$GLB,, | Performed by: INTERNAL MEDICINE

## 2024-08-19 PROCEDURE — 3062F POS MACROALBUMINURIA REV: CPT | Mod: CPTII,S$GLB,, | Performed by: INTERNAL MEDICINE

## 2024-08-19 PROCEDURE — 3079F DIAST BP 80-89 MM HG: CPT | Mod: CPTII,S$GLB,, | Performed by: INTERNAL MEDICINE

## 2024-08-19 PROCEDURE — 99214 OFFICE O/P EST MOD 30 MIN: CPT | Mod: S$GLB,,, | Performed by: INTERNAL MEDICINE

## 2024-08-19 PROCEDURE — 99999 PR PBB SHADOW E&M-EST. PATIENT-LVL III: CPT | Mod: PBBFAC,,, | Performed by: INTERNAL MEDICINE

## 2024-08-19 PROCEDURE — 3044F HG A1C LEVEL LT 7.0%: CPT | Mod: CPTII,S$GLB,, | Performed by: INTERNAL MEDICINE

## 2024-08-19 PROCEDURE — 3066F NEPHROPATHY DOC TX: CPT | Mod: CPTII,S$GLB,, | Performed by: INTERNAL MEDICINE

## 2024-08-19 NOTE — PROGRESS NOTES
"  Subjective:       Patient ID: Konstantin Montes is a 54 y.o. White male who presents for return patient evaluation for chronic renal failure.      We discussed gout dietary changes today.  He has no uremic or urinary symptoms and is in his usual state of health.        Review of Systems   Constitutional:  Positive for fatigue. Negative for chills, diaphoresis and fever.   Respiratory:  Positive for cough. Negative for shortness of breath.    Cardiovascular:  Negative for chest pain and leg swelling.   Gastrointestinal:  Negative for abdominal pain, diarrhea, nausea and vomiting.   Genitourinary:  Negative for difficulty urinating, dysuria and hematuria.   Musculoskeletal:  Negative for myalgias.   Neurological:  Negative for headaches.   Hematological:  Does not bruise/bleed easily.   Psychiatric/Behavioral:  Positive for sleep disturbance.        The past medical, family and social histories were reviewed for this encounter.     /82 (BP Location: Left arm, Patient Position: Standing, BP Method: Large (Manual))   Ht 5' 8" (1.727 m)   BMI 28.25 kg/m²     Objective:      Physical Exam  Vitals reviewed.   Constitutional:       General: He is not in acute distress.     Appearance: He is well-developed.   HENT:      Head: Normocephalic and atraumatic.   Eyes:      General: No scleral icterus.     Conjunctiva/sclera: Conjunctivae normal.   Neck:      Vascular: No JVD.   Cardiovascular:      Rate and Rhythm: Normal rate and regular rhythm.      Heart sounds: Normal heart sounds. No murmur heard.     No friction rub. No gallop.   Pulmonary:      Effort: Pulmonary effort is normal. No respiratory distress.      Breath sounds: Normal breath sounds. No wheezing.   Abdominal:      General: Bowel sounds are normal. There is no distension.      Palpations: Abdomen is soft.      Tenderness: There is no abdominal tenderness.   Musculoskeletal:      Right lower leg: No edema.      Left lower leg: No edema.   Skin:     General: " Skin is warm and dry.      Findings: No rash.   Neurological:      Mental Status: He is alert and oriented to person, place, and time.         Assessment:       1. Elevated serum creatinine        Lab Results   Component Value Date    CREATININE 1.41 (H) 08/12/2024    CREATININE 1.41 (H) 08/12/2024    BUN 18 08/12/2024    BUN 18 08/12/2024     08/12/2024     08/12/2024    K 4.3 08/12/2024    K 4.3 08/12/2024     08/12/2024     08/12/2024    CO2 27 08/12/2024    CO2 27 08/12/2024     Lab Results   Component Value Date    PTH 37.7 08/12/2024    CALCIUM 10.0 08/12/2024    CALCIUM 10.0 08/12/2024    PHOS 2.9 08/12/2024    PHOS 2.9 08/12/2024     Lab Results   Component Value Date    HCT 49.2 02/12/2024     Prot/Creat Ratio, Urine   Date Value Ref Range Status   08/12/2024 547.5 (H) 15.0 - 68.0 mg/g Final   02/16/2024 757.3 (H) 15.0 - 68.0 mg/g Final   10/30/2019 2.11 (H) 0.00 - 0.20 Final       Plan:   Return to clinic in 12 months.  Labs for next visit include labs per standing orders q 6 months.    Baseline creatinine is 1.3-1.5 since 2015.  Renal US shows R 9.5 cm L 10.1 cm.  UPC is improving and is down to 0.5.  Please avoid or minimize all NSAIDS (ibuprofen, motrin, aleve, indocin, naprosyn) to minimize the risk to your kidneys.  Aspirin in a dose of 325 mg or less a day is likely the safest with regards to kidney function.  If you are able to take aspirin and do not have any bleeding problems or ulcers, this may be your best therapy.  Alternatively, acetaminophen (Tylenol) is the safer than NSAIDs with regards to kidney function.  I would ask you take this as directed on the bottle.  It is best to stay under 2 grams a day (4-500 mg tablets a day maximum).  We discussed a gout diet.  I did send him an email regarding this.  Refer to Lifestyle Dietician.  He has been on opdovia in the past.  Cystatin c is 1.3 (GFR 56).  I suspect he did have some event that affected his kidney function prior  to 2019.  His urine protein is trending toward normal and his Scr has been stable with no HTN thus we will continue to monitor him.    KFRE 2-Year: 0.4% at 8/12/2024 10:37 AM  Calculated from:  Serum Creatinine: 1.41 mg/dL at 8/12/2024 10:37 AM  Urine Albumin Creatinine Ratio: 372.1 ug/mg at 2/16/2024  4:21 PM  Age: 53 years  Sex: Male at 8/12/2024 10:37 AM  Has CKD-3 to CKD-5: No    KFRE 5-Year: 1.4% at 8/12/2024 10:37 AM  Calculated from:  Serum Creatinine: 1.41 mg/dL at 8/12/2024 10:37 AM  Urine Albumin Creatinine Ratio: 372.1 ug/mg at 2/16/2024  4:21 PM  Age: 53 years  Sex: Male at 8/12/2024 10:37 AM  Has CKD-3 to CKD-5: No

## 2025-08-26 ENCOUNTER — OFFICE VISIT (OUTPATIENT)
Dept: NEPHROLOGY | Facility: CLINIC | Age: 55
End: 2025-08-26
Payer: COMMERCIAL

## 2025-08-26 VITALS — SYSTOLIC BLOOD PRESSURE: 142 MMHG | DIASTOLIC BLOOD PRESSURE: 90 MMHG | OXYGEN SATURATION: 96 % | HEART RATE: 78 BPM

## 2025-08-26 DIAGNOSIS — R79.89 ELEVATED SERUM CREATININE: Primary | ICD-10-CM

## 2025-08-26 PROCEDURE — 99999 PR PBB SHADOW E&M-EST. PATIENT-LVL III: CPT | Mod: PBBFAC,,, | Performed by: INTERNAL MEDICINE

## 2025-08-26 PROCEDURE — 1160F RVW MEDS BY RX/DR IN RCRD: CPT | Mod: CPTII,S$GLB,, | Performed by: INTERNAL MEDICINE

## 2025-08-26 PROCEDURE — 3066F NEPHROPATHY DOC TX: CPT | Mod: CPTII,S$GLB,, | Performed by: INTERNAL MEDICINE

## 2025-08-26 PROCEDURE — 3080F DIAST BP >= 90 MM HG: CPT | Mod: CPTII,S$GLB,, | Performed by: INTERNAL MEDICINE

## 2025-08-26 PROCEDURE — 99213 OFFICE O/P EST LOW 20 MIN: CPT | Mod: S$GLB,,, | Performed by: INTERNAL MEDICINE

## 2025-08-26 PROCEDURE — 3077F SYST BP >= 140 MM HG: CPT | Mod: CPTII,S$GLB,, | Performed by: INTERNAL MEDICINE

## 2025-08-26 PROCEDURE — 1159F MED LIST DOCD IN RCRD: CPT | Mod: CPTII,S$GLB,, | Performed by: INTERNAL MEDICINE

## 2025-08-26 RX ORDER — ALLOPURINOL 100 MG/1
100 TABLET ORAL DAILY
COMMUNITY
Start: 2025-07-23